# Patient Record
Sex: FEMALE | Race: WHITE | NOT HISPANIC OR LATINO | Employment: UNEMPLOYED | URBAN - METROPOLITAN AREA
[De-identification: names, ages, dates, MRNs, and addresses within clinical notes are randomized per-mention and may not be internally consistent; named-entity substitution may affect disease eponyms.]

---

## 2023-09-22 ENCOUNTER — OFFICE VISIT (OUTPATIENT)
Dept: URGENT CARE | Facility: CLINIC | Age: 53
End: 2023-09-22

## 2023-09-22 VITALS
DIASTOLIC BLOOD PRESSURE: 82 MMHG | OXYGEN SATURATION: 98 % | WEIGHT: 186.1 LBS | HEIGHT: 64 IN | HEART RATE: 79 BPM | SYSTOLIC BLOOD PRESSURE: 122 MMHG | BODY MASS INDEX: 31.77 KG/M2 | TEMPERATURE: 97.7 F | RESPIRATION RATE: 20 BRPM

## 2023-09-22 DIAGNOSIS — K02.9 DENTAL CARIES: ICD-10-CM

## 2023-09-22 DIAGNOSIS — K04.7 DENTAL INFECTION: ICD-10-CM

## 2023-09-22 DIAGNOSIS — R35.0 INCREASED URINARY FREQUENCY: ICD-10-CM

## 2023-09-22 LAB
APPEARANCE UR: CLEAR
BILIRUB UR STRIP-MCNC: NEGATIVE MG/DL
COLOR UR AUTO: YELLOW
GLUCOSE UR STRIP.AUTO-MCNC: NEGATIVE MG/DL
KETONES UR STRIP.AUTO-MCNC: NORMAL MG/DL
LEUKOCYTE ESTERASE UR QL STRIP.AUTO: NEGATIVE
NITRITE UR QL STRIP.AUTO: NEGATIVE
PH UR STRIP.AUTO: 5.5 [PH] (ref 5–8)
PROT UR QL STRIP: NEGATIVE MG/DL
RBC UR QL AUTO: NEGATIVE
SP GR UR STRIP.AUTO: 1.02
UROBILINOGEN UR STRIP-MCNC: 1 MG/DL

## 2023-09-22 PROCEDURE — 3079F DIAST BP 80-89 MM HG: CPT | Performed by: STUDENT IN AN ORGANIZED HEALTH CARE EDUCATION/TRAINING PROGRAM

## 2023-09-22 PROCEDURE — 3074F SYST BP LT 130 MM HG: CPT | Performed by: STUDENT IN AN ORGANIZED HEALTH CARE EDUCATION/TRAINING PROGRAM

## 2023-09-22 PROCEDURE — 99203 OFFICE O/P NEW LOW 30 MIN: CPT | Performed by: STUDENT IN AN ORGANIZED HEALTH CARE EDUCATION/TRAINING PROGRAM

## 2023-09-22 PROCEDURE — 81002 URINALYSIS NONAUTO W/O SCOPE: CPT | Performed by: STUDENT IN AN ORGANIZED HEALTH CARE EDUCATION/TRAINING PROGRAM

## 2023-09-22 RX ORDER — AMOXICILLIN 500 MG/1
500 CAPSULE ORAL 3 TIMES DAILY
Qty: 21 CAPSULE | Refills: 0 | Status: SHIPPED | OUTPATIENT
Start: 2023-09-22 | End: 2023-09-29

## 2023-09-22 RX ORDER — LISINOPRIL 10 MG/1
10 TABLET ORAL DAILY
COMMUNITY

## 2023-09-22 RX ORDER — LEVOTHYROXINE SODIUM 0.05 MG/1
50 TABLET ORAL
COMMUNITY

## 2023-09-22 NOTE — PROGRESS NOTES
Subjective:   Filomena Velasco is a 53 y.o. female who presents for Abdominal Pain ([id number] LOWER ABDOMEN PRESSURE/FREQUENT URINATION/)      HPI:  Pleasant 53-year-old female presents to the urgent care with multiple complaints.  Patient's first complaint is increased urinary frequency that has been intermittent over the past few days.  She states that she has increased her fluid intake and thinks it may be due to this.  She denies any urinary urgency or burning with urination.  She states that she did have a small amount of lower abdominal cramping a few days ago but this has completely gone away.  Her other issue is concern for dental infection.  She reports numerous cavities and loose teeth.  She states that she does have a foul smell in her mouth and has had dental infections in the past that presented the same.  She states that she has noticed some swelling to her gums and bleeding.  Denies any back pain, flank pain, current abdominal pain, shortness of breath, chest pain, difficulty swallowing, sore throat, purulent discharge in the mouth, dizziness, headache, lightheadedness, palpitations, racing heart rate.      Medications:    amoxicillin Caps  levothyroxine Tabs  lisinopril Tabs    Allergies: Patient has no known allergies.    Problem List: Filomena Velasco does not have a problem list on file.    Surgical History:  No past surgical history on file.    Past Social Hx: Filomena Velasco  reports that she has quit smoking. Her smoking use included cigarettes. She has never used smokeless tobacco. She reports that she does not currently use alcohol. She reports that she does not currently use drugs.     Past Family Hx:  Filomena Velasco family history is not on file.     Problem list, medications, and allergies reviewed by myself today in Epic.     Objective:     /82 (BP Location: Left arm, Patient Position: Sitting)   Pulse 79   Temp 36.5 °C (97.7 °F) (Temporal)   Resp 20   Ht 1.626 m  "(5' 4\")   Wt 84.4 kg (186 lb 1.6 oz)   SpO2 98%   BMI 31.94 kg/m²     Physical Exam  Vitals reviewed.   Constitutional:       General: She is not in acute distress.     Appearance: Normal appearance.   HENT:      Nose: Nose normal.      Mouth/Throat:      Mouth: Mucous membranes are moist. No angioedema.      Dentition: Dental tenderness, gingival swelling and dental caries present. No dental abscesses.      Pharynx: Uvula midline.      Tonsils: No tonsillar abscesses.      Comments: Mild bleeding from the upper gums bilaterally.  Eyes:      Conjunctiva/sclera: Conjunctivae normal.      Pupils: Pupils are equal, round, and reactive to light.   Cardiovascular:      Rate and Rhythm: Normal rate.      Pulses: Normal pulses.   Pulmonary:      Effort: Pulmonary effort is normal.   Abdominal:      General: Abdomen is flat.      Palpations: Abdomen is soft.      Tenderness: There is no abdominal tenderness. There is no right CVA tenderness or left CVA tenderness.   Musculoskeletal:      Cervical back: Normal range of motion.   Skin:     General: Skin is warm and dry.   Neurological:      General: No focal deficit present.      Mental Status: She is alert and oriented to person, place, and time.       Lab Results/POC Test Results   Results for orders placed or performed in visit on 09/22/23   POCT Urinalysis   Result Value Ref Range    POC Color yellow Negative    POC Appearance clear Negative    POC Glucose negative Negative mg/dL    POC Bilirubin negative Negative mg/dL    POC Ketones trace Negative mg/dL    POC Specific Gravity 1.020 <1.005 - >1.030    POC Blood negative Negative    POC Urine PH 5.5 5.0 - 8.0    POC Protein negative Negative mg/dL    POC Urobiligen 1.0 Negative (0.2) mg/dL    POC Nitrites negative Negative    POC Leukocyte Esterase negative Negative             Assessment/Plan:     Diagnosis and associated orders:     1. Increased urinary frequency  POCT Urinalysis      2. Dental infection  " amoxicillin (AMOXIL) 500 MG Cap      3. Dental caries           Comments/MDM:     POCT urinalysis shows no signs of infection.  Patient has been having intermittent increased urinary frequency without any other urinary tract symptoms.  Low suspicion for acute cystitis at this time.  Unfortunately the patient is self-pay therefore I will not be able to order a urine culture due to cost.  Patient does have multiple dental caries and loose teeth with gingival swelling and mild bleeding.  No visualized abscess.  She does have dental tenderness.  I do suspect dental infection at this time.  I will start her on amoxicillin which should cover for dental infection and should have some coverage for potential urinary tract infection.  She has established with [facility] to get set up with a dentist but she cannot be seen until the beginning of October.  Vitals all within normal limits.  No angioedema.  No difficulty swallowing.  No signs of acute abdomen at this time.  I do not suspect pyelonephritis.  No CVA tenderness bilaterally.  Afebrile.  ED/return precautions given.         Differential diagnosis, natural history, supportive care, and indications for immediate follow-up discussed.    Advised the patient to follow-up with the primary care physician for recheck, reevaluation, and consideration of further management.    Please note that this dictation was created using voice recognition software. I have made a reasonable attempt to correct obvious errors, but I expect that there are errors of grammar and possibly content that I did not discover before finalizing the note.    Electronically signed by Edson Marrufo PA-C.